# Patient Record
Sex: MALE | Race: WHITE | URBAN - METROPOLITAN AREA
[De-identification: names, ages, dates, MRNs, and addresses within clinical notes are randomized per-mention and may not be internally consistent; named-entity substitution may affect disease eponyms.]

---

## 2021-09-09 ENCOUNTER — OFFICE VISIT (OUTPATIENT)
Dept: URBAN - METROPOLITAN AREA CLINIC 17 | Facility: CLINIC | Age: 26
End: 2021-09-09
Payer: COMMERCIAL

## 2021-09-09 DIAGNOSIS — T15.01XA FOREIGN BODY IN CORNEA, RIGHT EYE, INITIAL ENCOUNTER: Primary | ICD-10-CM

## 2021-09-09 PROCEDURE — 99203 OFFICE O/P NEW LOW 30 MIN: CPT | Performed by: OPTOMETRIST

## 2021-09-09 RX ORDER — TOBRAMYCIN AND DEXAMETHASONE 3; 1 MG/ML; MG/ML
SUSPENSION/ DROPS OPHTHALMIC
Qty: 5 | Refills: 0 | Status: ACTIVE
Start: 2021-09-09

## 2021-09-09 ASSESSMENT — INTRAOCULAR PRESSURE
OS: 12
OD: 15

## 2021-09-09 NOTE — IMPRESSION/PLAN
Impression: Foreign body in cornea, right eye, initial encounter: T15.01XA. Plan: Discussed diagnosis in detail with patient. Instilled Proparacaine OD, removed rust with ALEIDA and [place] brush, instilled Ofloxacin after procedure. Pt tolerated. Pt will start Tobramycin Dexamethasone QID OD x1week, erx'd today. Pt will contact office PRN. Pt may experience pain after procedure once anesthetic wears off, ok for patient to use AFT's PRN.